# Patient Record
Sex: FEMALE | Race: ASIAN | URBAN - METROPOLITAN AREA
[De-identification: names, ages, dates, MRNs, and addresses within clinical notes are randomized per-mention and may not be internally consistent; named-entity substitution may affect disease eponyms.]

---

## 2017-11-03 ENCOUNTER — OFFICE VISIT (OUTPATIENT)
Dept: PEDIATRICS | Facility: CLINIC | Age: 32
End: 2017-11-03
Payer: COMMERCIAL

## 2017-11-03 VITALS
BODY MASS INDEX: 22.32 KG/M2 | HEIGHT: 63 IN | TEMPERATURE: 98.3 F | SYSTOLIC BLOOD PRESSURE: 100 MMHG | DIASTOLIC BLOOD PRESSURE: 64 MMHG | OXYGEN SATURATION: 98 % | HEART RATE: 88 BPM | WEIGHT: 126 LBS

## 2017-11-03 DIAGNOSIS — Z23 NEED FOR PROPHYLACTIC VACCINATION AND INOCULATION AGAINST INFLUENZA: ICD-10-CM

## 2017-11-03 DIAGNOSIS — Z13.6 SCREENING FOR CARDIOVASCULAR CONDITION: ICD-10-CM

## 2017-11-03 DIAGNOSIS — Z12.4 SCREENING FOR CERVICAL CANCER: ICD-10-CM

## 2017-11-03 DIAGNOSIS — Z86.39 HISTORY OF HYPOTHYROIDISM: ICD-10-CM

## 2017-11-03 DIAGNOSIS — Z13.1 SCREENING FOR DIABETES MELLITUS: ICD-10-CM

## 2017-11-03 DIAGNOSIS — Z23 NEED FOR TDAP VACCINATION: ICD-10-CM

## 2017-11-03 DIAGNOSIS — Z01.419 ENCOUNTER FOR GYNECOLOGICAL EXAMINATION WITHOUT ABNORMAL FINDING: Primary | ICD-10-CM

## 2017-11-03 PROCEDURE — G0145 SCR C/V CYTO,THINLAYER,RESCR: HCPCS | Performed by: PEDIATRICS

## 2017-11-03 PROCEDURE — 90715 TDAP VACCINE 7 YRS/> IM: CPT | Performed by: PEDIATRICS

## 2017-11-03 PROCEDURE — 90472 IMMUNIZATION ADMIN EACH ADD: CPT | Performed by: PEDIATRICS

## 2017-11-03 PROCEDURE — 90471 IMMUNIZATION ADMIN: CPT | Performed by: PEDIATRICS

## 2017-11-03 PROCEDURE — 90686 IIV4 VACC NO PRSV 0.5 ML IM: CPT | Performed by: PEDIATRICS

## 2017-11-03 PROCEDURE — 87624 HPV HI-RISK TYP POOLED RSLT: CPT | Performed by: PEDIATRICS

## 2017-11-03 PROCEDURE — 99385 PREV VISIT NEW AGE 18-39: CPT | Mod: 25 | Performed by: PEDIATRICS

## 2017-11-03 NOTE — PROGRESS NOTES
SUBJECTIVE:   CC: Julianna Nunez is an 32 year old woman who presents for preventive health visit.     Physical   Annual:     Getting at least 3 servings of Calcium per day::  Yes    Bi-annual eye exam::  NO    Dental care twice a year::  NO    Sleep apnea or symptoms of sleep apnea::  None    Diet::  Regular (no restrictions)    Frequency of exercise::  None    Taking medications regularly::  Yes    Medication side effects::  None    Additional concerns today::  YES    Fainted 2 weeks ago after going to the bathroom - has been fine since then.    Is at home taking care of 2.6 y/o boys    Today's PHQ-2 Score:   PHQ-2 ( 1999 [employer]) 11/3/2017   Q1: Little interest or pleasure in doing things 0   Q2: Feeling down, depressed or hopeless 0   PHQ-2 Score 0   Q1: Little interest or pleasure in doing things Not at all   Q2: Feeling down, depressed or hopeless Not at all   PHQ-2 Score 0       Abuse: Current or Past(Physical, Sexual or Emotional)- No  Do you feel safe in your environment - Yes    Social History   Substance Use Topics     Smoking status: Never Smoker     Smokeless tobacco: Never Used     Alcohol use No     The patient does not drink >3 drinks per day nor >7 drinks per week.    Reviewed orders with patient.  Reviewed health maintenance and updated orders accordingly - Yes      Mammogram not appropriate for this patient based on age.    Pertinent mammograms are reviewed under the imaging tab.  History of abnormal Pap smear: NO - age 30-65 PAP every 5 years with negative HPV co-testing recommended    Reviewed and updated as needed this visit by clinical staff  Tobacco  Allergies  Meds  Med Hx  Surg Hx  Fam Hx  Soc Hx        Reviewed and updated as needed this visit by Provider          Review of Systems  C: NEGATIVE for fever, chills, change in weight  I: NEGATIVE for worrisome rashes, moles or lesions  E: NEGATIVE for vision changes or irritation  ENT: NEGATIVE for ear, mouth and throat problems  R:  "NEGATIVE for significant cough or SOB  B: NEGATIVE for masses, tenderness or discharge  CV: NEGATIVE for chest pain, palpitations or peripheral edema  GI: NEGATIVE for nausea, abdominal pain, heartburn, or change in bowel habits  : NEGATIVE for unusual urinary or vaginal symptoms. Periods are regular.  M: NEGATIVE for significant arthralgias or myalgia  N: NEGATIVE for weakness, dizziness or paresthesias  P: NEGATIVE for changes in mood or affect     OBJECTIVE:   /64 (BP Location: Right arm, Cuff Size: Adult Regular)  Pulse 88  Temp 98.3  F (36.8  C) (Oral)  Ht 5' 2.75\" (1.594 m)  Wt 126 lb (57.2 kg)  LMP 10/30/2017  SpO2 98%  BMI 22.5 kg/m2  Physical Exam  GENERAL: healthy, alert and no distress  EYES: Eyes grossly normal to inspection, PERRL and conjunctivae and sclerae normal  HENT: ear canals and TM's normal, nose and mouth without ulcers or lesions  NECK: no adenopathy, no asymmetry, masses, or scars and thyroid normal to palpation  RESP: lungs clear to auscultation - no rales, rhonchi or wheezes  BREAST: normal without masses, tenderness or nipple discharge and no palpable axillary masses or adenopathy  CV: regular rate and rhythm, normal S1 S2, no S3 or S4, no murmur, click or rub, no peripheral edema and peripheral pulses strong  ABDOMEN: soft, nontender, no hepatosplenomegaly, no masses and bowel sounds normal   (female): normal female external genitalia, normal urethral meatus, vaginal mucosa pink, moist, well rugated, and normal cervix/adnexa/uterus without masses or discharge  MS: no gross musculoskeletal defects noted, no edema  SKIN: no suspicious lesions or rashes  NEURO: Normal strength and tone, mentation intact and speech normal  PSYCH: mentation appears normal, affect normal/bright    ASSESSMENT/PLAN:   1. Encounter for gynecological examination without abnormal finding    2. Screening for cervical cancer  - Pap imaged thin layer screen with HPV - recommended age 30 - 65 years " "(select HPV order below)  - HPV High Risk Types DNA Cervical    3. Need for prophylactic vaccination and inoculation against influenza  - FLU VAC, SPLIT VIRUS IM > 3 YO (QUADRIVALENT) [76796]    4. Need for Tdap vaccination  - TDAP VACCINE (ADACEL)  - VACCINE ADMINISTRATION, EACH ADDITIONAL    5. Screening for diabetes mellitus  - Glucose; Future    6. Screening for cardiovascular condition  - Lipid Profile (Chol, Trig, HDL, LDL calc); Future    7. History of hypothyroidism  During pregnancy  - TSH with free T4 reflex; Future    COUNSELING:  Reviewed preventive health counseling, as reflected in patient instructions         reports that she has never smoked. She has never used smokeless tobacco.    Estimated body mass index is 22.5 kg/(m^2) as calculated from the following:    Height as of this encounter: 5' 2.75\" (1.594 m).    Weight as of this encounter: 126 lb (57.2 kg).         Counseling Resources:  ATP IV Guidelines  Pooled Cohorts Equation Calculator  Breast Cancer Risk Calculator  FRAX Risk Assessment  ICSI Preventive Guidelines  Dietary Guidelines for Americans, 2010  USDA's MyPlate  ASA Prophylaxis  Lung CA Screening    Jenny Bland MD  [facility]  "

## 2017-11-03 NOTE — NURSING NOTE
"Chief Complaint   Patient presents with     Physical       Initial /64 (BP Location: Right arm, Cuff Size: Adult Regular)  Pulse 88  Temp 98.3  F (36.8  C) (Oral)  Ht 5' 2.75\" (1.594 m)  Wt 126 lb (57.2 kg)  LMP 10/30/2017  SpO2 98%  BMI 22.5 kg/m2 Estimated body mass index is 22.5 kg/(m^2) as calculated from the following:    Height as of this encounter: 5' 2.75\" (1.594 m).    Weight as of this encounter: 126 lb (57.2 kg).  Medication Reconciliation: complete   Neela Rousseau MA    "

## 2017-11-03 NOTE — PROGRESS NOTES
Injectable Influenza Immunization Documentation    1.  Is the person to be vaccinated sick today?   No    2. Does the person to be vaccinated have an allergy to a component   of the vaccine?   No  Egg Allergy Algorithm Link    3. Has the person to be vaccinated ever had a serious reaction   to influenza vaccine in the past?   No    4. Has the person to be vaccinated ever had Guillain-Barré syndrome?   No    Form completed by Neela Rousseau MA         Screening Questionnaire for Adult Immunization    Are you sick today?   No   Do you have allergies to medications, food, a vaccine component or latex?   No   Have you ever had a serious reaction after receiving a vaccination?   No   Do you have a long-term health problem with heart disease, lung disease, asthma, kidney disease, metabolic disease (e.g. diabetes), anemia, or other blood disorder?   No   Do you have cancer, leukemia, HIV/AIDS, or any other immune system problem?   No   In the past 3 months, have you taken medications that affect  your immune system, such as prednisone, other steroids, or anticancer drugs; drugs for the treatment of rheumatoid arthritis, Crohn s disease, or psoriasis; or have you had radiation treatments?   No   Have you had a seizure, or a brain or other nervous system problem?   No   During the past year, have you received a transfusion of blood or blood     products, or been given immune (gamma) globulin or antiviral drug?   No   For women: Are you pregnant or is there a chance you could become        pregnant during the next month?   No   Have you received any vaccinations in the past 4 weeks?   No     Immunization questionnaire answers were all negative.        Per orders of Dr. Bland, injection of Tdap and Flu given by Neela Rousseau. Patient instructed to remain in clinic for 15 minutes afterwards, and to report any adverse reaction to me immediately.       Screening performed by Neela Rousseau on 11/3/2017 at 2:32 PM.

## 2017-11-03 NOTE — MR AVS SNAPSHOT
After Visit Summary   11/3/2017    Julianna Nunez    MRN: [id number]           Patient Information     Date Of Birth          1985        Visit Information        Provider Department      11/3/2017 1:40 PM Jenny Bland MD [facility]        Today's Diagnoses     Encounter for gynecological examination without abnormal finding    -  1    Screening for cervical cancer          Care Instructions      Preventive Health Recommendations  Female Ages 26 - 39  Yearly exam:   See your health care provider every year in order to    Review health changes.     Discuss preventive care.      Review your medicines if you your doctor has prescribed any.    Until age 30: Get a Pap test every three years (more often if you have had an abnormal result).    After age 30: Talk to your doctor about whether you should have a Pap test every 3 years or have a Pap test with HPV screening every 5 years.   You do not need a Pap test if your uterus was removed (hysterectomy) and you have not had cancer.  You should be tested each year for STDs (sexually transmitted diseases), if you're at risk.   Talk to your provider about how often to have your cholesterol checked.  If you are at risk for diabetes, you should have a diabetes test (fasting glucose).  Shots: Get a flu shot each year. Get a tetanus shot every 10 years.   Nutrition:     Eat at least 5 servings of fruits and vegetables each day.    Eat whole-grain bread, whole-wheat pasta and brown rice instead of white grains and rice.    Talk to your provider about Calcium and Vitamin D.     Lifestyle    Exercise at least 150 minutes a week (30 minutes a day, 5 days of the week). This will help you control your weight and prevent disease.    Limit alcohol to one drink per day.    No smoking.     Wear sunscreen to prevent skin cancer.    See your dentist every six months for an exam and cleaning.            Follow-ups after your visit        Who to contact   "   If you have questions or need follow up information about today's clinic visit or your schedule please contact [facility] directly at [phone].  Normal or non-critical lab and imaging results will be communicated to you by MyChart, letter or phone within 4 business days after the clinic has received the results. If you do not hear from us within 7 days, please contact the clinic through [employer] or phone. If you have a critical or abnormal lab result, we will notify you by phone as soon as possible.  Submit refill requests through [employer] or call your pharmacy and they will forward the refill request to us. Please allow 3 business days for your refill to be completed.          Additional Information About Your Visit        [employer] Information     [employer] lets you send messages to your doctor, view your test results, renew your prescriptions, schedule appointments and more. To sign up, go to [email or web address] . Click on \"Log in\" on the left side of the screen, which will take you to the Welcome page. Then click on \"Sign up Now\" on the right side of the page.     You will be asked to enter the access code listed below, as well as some personal information. Please follow the directions to create your username and password.     Your access code is: [email or web address]  Expires: 2018  2:24 PM     Your access code will  in 90 days. If you need help or a new code, please call your [place] clinic or [phone].        Care EveryWhere ID     This is your Care EveryWhere ID. This could be used by other organizations to access your [place] medical records  [id number]        Your Vitals Were     Pulse Temperature Height Last Period Pulse Oximetry BMI (Body Mass Index)    88 98.3  F (36.8  C) (Oral) 5' 2.75\" (1.594 m) 10/30/2017 98% 22.5 kg/m2       Blood Pressure from Last 3 Encounters:   17 100/64    Weight from Last 3 Encounters:   17 126 lb (57.2 kg)              We Performed the " Following     HPV High Risk Types DNA Cervical     Pap imaged thin layer screen with HPV - recommended age 30 - 65 years (select HPV order below)        Primary Care Provider Office Phone # Fax #    Jenny Bland [phone] [address] [place] [address]        Equal Access to Services     [facility]: Hadii aad ku hadasho Soomaali, waaxda luqadaha, qaybta kaalmada adeegyada, waxulisses hahnin hayshayyn maria del carmen sanabria. So [facility] [phone].    ATENCIÓN: Si habla español, tiene a mccarty disposición servicios gratuitos de asistencia lingüística. [facility] [phone].    We comply with applicable federal civil rights laws and [place] laws. We do not discriminate on the basis of race, color, national origin, age, disability, sex, sexual orientation, or gender identity.            Thank you!     Thank you for choosing [facility]  for your care. Our goal is always to provide you with excellent care. Hearing back from our patients is one way we can continue to improve our services. Please take a few minutes to complete the written survey that you may receive in the mail after your visit with us. Thank you!             Your Updated Medication List - Protect others around you: Learn how to safely use, store and throw away your medicines at www.disposemymeds.org.      Notice  As of 11/3/2017  2:24 PM    You have not been prescribed any medications.

## 2017-11-08 LAB
COPATH REPORT: NORMAL
PAP: NORMAL

## 2017-11-09 LAB
FINAL DIAGNOSIS: NORMAL
HPV HR 12 DNA CVX QL NAA+PROBE: NEGATIVE
HPV16 DNA SPEC QL NAA+PROBE: NEGATIVE
HPV18 DNA SPEC QL NAA+PROBE: NEGATIVE
SPECIMEN DESCRIPTION: NORMAL

## 2017-11-18 DIAGNOSIS — Z13.6 SCREENING FOR CARDIOVASCULAR CONDITION: ICD-10-CM

## 2017-11-18 DIAGNOSIS — Z86.39 HISTORY OF HYPOTHYROIDISM: ICD-10-CM

## 2017-11-18 DIAGNOSIS — Z13.1 SCREENING FOR DIABETES MELLITUS: ICD-10-CM

## 2017-11-18 PROCEDURE — 84439 ASSAY OF FREE THYROXINE: CPT | Performed by: PEDIATRICS

## 2017-11-18 PROCEDURE — 82947 ASSAY GLUCOSE BLOOD QUANT: CPT | Performed by: PEDIATRICS

## 2017-11-18 PROCEDURE — 80061 LIPID PANEL: CPT | Performed by: PEDIATRICS

## 2017-11-18 PROCEDURE — 36415 COLL VENOUS BLD VENIPUNCTURE: CPT | Performed by: PEDIATRICS

## 2017-11-18 PROCEDURE — 84443 ASSAY THYROID STIM HORMONE: CPT | Performed by: PEDIATRICS

## 2017-11-19 LAB
CHOLEST SERPL-MCNC: 128 MG/DL
GLUCOSE SERPL-MCNC: 88 MG/DL (ref 70–99)
HDLC SERPL-MCNC: 46 MG/DL
LDLC SERPL CALC-MCNC: 62 MG/DL
NONHDLC SERPL-MCNC: 82 MG/DL
T4 FREE SERPL-MCNC: 0.8 NG/DL (ref 0.76–1.46)
TRIGL SERPL-MCNC: 101 MG/DL
TSH SERPL DL<=0.005 MIU/L-ACNC: 32.96 MU/L (ref 0.4–4)

## 2017-11-29 ENCOUNTER — OFFICE VISIT (OUTPATIENT)
Dept: PEDIATRICS | Facility: CLINIC | Age: 32
End: 2017-11-29
Payer: COMMERCIAL

## 2017-11-29 VITALS
OXYGEN SATURATION: 99 % | WEIGHT: 126 LBS | BODY MASS INDEX: 22.32 KG/M2 | HEIGHT: 63 IN | HEART RATE: 78 BPM | TEMPERATURE: 97.9 F | DIASTOLIC BLOOD PRESSURE: 64 MMHG | SYSTOLIC BLOOD PRESSURE: 98 MMHG

## 2017-11-29 DIAGNOSIS — E03.8 OTHER SPECIFIED HYPOTHYROIDISM: Primary | ICD-10-CM

## 2017-11-29 DIAGNOSIS — R53.83 OTHER FATIGUE: ICD-10-CM

## 2017-11-29 DIAGNOSIS — E55.9 VITAMIN D DEFICIENCY: ICD-10-CM

## 2017-11-29 PROBLEM — Z86.39 HISTORY OF HYPOTHYROIDISM: Status: RESOLVED | Noted: 2017-11-03 | Resolved: 2017-11-29

## 2017-11-29 LAB
ANION GAP SERPL CALCULATED.3IONS-SCNC: 6 MMOL/L (ref 3–14)
BUN SERPL-MCNC: 9 MG/DL (ref 7–30)
CALCIUM SERPL-MCNC: 9.4 MG/DL (ref 8.5–10.1)
CHLORIDE SERPL-SCNC: 107 MMOL/L (ref 94–109)
CO2 SERPL-SCNC: 26 MMOL/L (ref 20–32)
CREAT SERPL-MCNC: 0.58 MG/DL (ref 0.52–1.04)
DEPRECATED CALCIDIOL+CALCIFEROL SERPL-MC: 7 UG/L (ref 20–75)
GFR SERPL CREATININE-BSD FRML MDRD: >90 ML/MIN/1.7M2
GLUCOSE SERPL-MCNC: 82 MG/DL (ref 70–99)
HGB BLD-MCNC: 12.1 G/DL (ref 11.7–15.7)
POTASSIUM SERPL-SCNC: 3.9 MMOL/L (ref 3.4–5.3)
SODIUM SERPL-SCNC: 139 MMOL/L (ref 133–144)

## 2017-11-29 PROCEDURE — 82306 VITAMIN D 25 HYDROXY: CPT | Performed by: PEDIATRICS

## 2017-11-29 PROCEDURE — 80048 BASIC METABOLIC PNL TOTAL CA: CPT | Performed by: PEDIATRICS

## 2017-11-29 PROCEDURE — 85018 HEMOGLOBIN: CPT | Performed by: PEDIATRICS

## 2017-11-29 PROCEDURE — 99214 OFFICE O/P EST MOD 30 MIN: CPT | Performed by: PEDIATRICS

## 2017-11-29 PROCEDURE — 36415 COLL VENOUS BLD VENIPUNCTURE: CPT | Performed by: PEDIATRICS

## 2017-11-29 RX ORDER — LEVOTHYROXINE SODIUM 75 UG/1
75 TABLET ORAL DAILY
Qty: 90 TABLET | Refills: 1 | Status: SHIPPED | OUTPATIENT
Start: 2017-11-29 | End: 2018-06-29

## 2017-11-29 NOTE — PATIENT INSTRUCTIONS
Start synthroid 75mcg today.    Make lab appt in 6-8 weeks.    Hypothyroidism       You have hypothyroidism. This means your thyroid gland is not making enough thyroid hormone. This hormone is vital to body growth and metabolism. If you don t make enough, many body processes slow down. This can cause symptoms throughout the body. Hypothyroidism can range from mild to severe. The most severe form is called myxedema.  There are a number of causes of hypothyroidism. A common cause is Hashimoto s disease. This disease causes the body s own immune system to attack the thyroid gland. When you have certain treatments, such as surgery to remove the thyroid gland, this can also cause hypothyroidism.  Symptoms of hypothyroidism can include:    Fatigue    Trouble concentrating or thinking clearly; forgetfulness    Dry skin    Hair loss    Weight gain    Low tolerance to cold    Constipation    Depression    Personality changes    Tingling or prickling of the hands or feet    Heavy, absent, or irregular periods (women only)  Older adults may sometimes have other symptoms. These can include:    Muscle aches and weakness    Confusion    Incontinence (unable to control urine or stool)    Trouble moving around    Falling  Treatment for hypothyroidism involves taking thyroid hormone pills daily. These pills replace the hormone your thyroid doesn t make. You will likely need to take a daily pill for the rest of your life. Tips for taking this medicine are given below.  Home care  Tips for taking your medicine    Take your thyroid hormone pills as prescribed by your healthcare provider. This is most often 1 pill a day on an empty stomach. Use a pillbox labeled with the days of the week. This will help you remember to take your pill each day.    Don t take products that contain iron and calcium or antacids within 4 hours of taking your thyroid hormone pills.    Don t take other medicines with your thyroid hormone pill without  checking with your provider first.    Tell your provider if you have any side effects from your medicines that bother you.    Never change the dosage or stop taking your thyroid pills without talking to your provider first.  General care    Always talk with your provider before trying other medicines or treatments for your thyroid problem.    If you see other healthcare providers, be sure to let them know about your thyroid problem.  Follow-up care  See your healthcare provider for checkups as advised. You may need regular tests to check the level of thyroid hormone in your blood.  When to seek medical advice  Call your healthcare provider right away if any of these occur:    New symptoms develop    Symptoms return, continue, or worsen even after treatment    Extreme fatigue    Puffy hands, face, or feet    Fast or irregular heartbeat    Confusion  Call 911  Call 911 right away if any of these occur:    Fainting    Chest pain    Shortness of breath or trouble breathing  Date Last Reviewed: 8/24/2015 2000-2017 [employer]. [address] [place] [address]. All rights reserved. This information is not intended as a substitute for professional medical care. Always follow your healthcare professional's instructions.        Treating Thyroid Problems    Your healthcare provider has diagnosed a thyroid problem and will work with you to create a treatment plan. Even if you don t have symptoms, getting proper care is important. These are the most common types of thyroid disorders and their treatments.  Treating hypothyroidism  Hypothyroidism is an underactive thyroid. There is no cure for this condition. But treatment can relieve most or all of your symptoms caused by the low thyroid hormone levels. Treatment is done with daily thyroid hormone pills. Thyroid hormone pills replace the hormone your thyroid doesn t make. You will likely need to take a daily pill for the rest of your  life.  Your healthcare provider will adjust your dose to achieve the right hormone levels. Take the thyroid hormone pill on an empty stomach, without other medicines. This is to make sure it works as it should.  Over time, your dose may be adjusted. The medicine has minimal side effects if the dosage is correct. But if the dosage is too high, you may have symptoms of an overactive thyroid. These include nervousness, irritability, fast heartbeat, tremors, trouble sleeping, and brittle hair. If the dosage is too low, you may have symptoms of an underactive thyroid. These include dry skin, low energy, sleepiness, and memory problems. Tell your healthcare provider if you notice any symptoms of thyroid problems.  Treating hyperthyroidism  Hyperthyroidism is an overactive thyroid. The treatments include:    Anti-thyroid medicine. This can reduce the amount of thyroid hormone made by your thyroid gland. Reactions from this medicine are rare. But in some cases, it can cause a dangerous drop in white blood cells, and liver damage. Talk with your healthcare provider for more information. If you have a fever or sore throat while taking this medicine, tell your healthcare provider right away.    Radioactive iodine ablation. This is the most common treatment for hyperthyroidism. It s done by taking a pill or liquid dose of radioactive iodine. This treatment destroys the thyroid cells that are making too much hormone. You may need daily thyroid hormone pills after this treatment.    Surgery. This is done by removing part or all of your thyroid gland. After surgery, you may need to take daily thyroid hormone pills.    Beta-blockers. The treatments above may be used alone or with beta-blockers. These are medicines that can reduce symptoms caused by too much thyroid hormone. In some case, symptoms from too much thyroid hormone can be controlled by beta-blockers alone.  Treating nodules  If you have benign nodules, you may not need  treatment right away. Instead, your healthcare provider may advise regular exams and ultrasound tests to see if the nodules grow. If treatment is needed, it may include:    Anti-thyroid medicine. If a benign thyroid nodule is causing an overactive thyroid, your healthcare provider may first treat it with anti-thyroid medicine. If this doesn t work, you may need one of the below treatments.    Radioactive iodine ablation. This is the most common treatment for hyperthyroidism. It s done by taking a pill or liquid dose of radioactive iodine. This treatment destroys the thyroid cells that are making too much hormone. You may need daily thyroid hormone pills after this treatment.    Surgery. This may be done to treat nodules that are causing symptoms, such as choking, or trouble swallowing. Surgery is done by removing part or all of your thyroid gland. Surgery to remove cancerous nodules may be followed by radioiodine iodine ablation. After surgery, you may need to take daily thyroid hormone pills.  If you have radioactive iodine ablation  Even though it uses tiny amounts of radiation, radioactive iodine ablation is a safe treatment. Your healthcare provider will talk with you about any risks and possible complications. You will likely receive the iodine at the hospital and go home the same day. The risk from the radiation to yourself and others is very small. However, you may need to stay away from other people for several days. It is most important to avoid children and pregnant women during this time.   Date Last Reviewed: 11/1/2016 2000-2017 [employer]. [address] [place] [address], [place] [address]. All rights reserved. This information is not intended as a substitute for professional medical care. Always follow your healthcare professional's instructions.

## 2017-11-29 NOTE — PROGRESS NOTES
"  SUBJECTIVE:   Julianna Levi is a 32 year old female who presents to clinic today for the following health issues:      Thyroid test results from 11-    Amount of exercise or physical activity: None    Problems taking medications regularly: No    Medication side effects: not applicable    Diet: regular (no restrictions)    Patient also concerned about other causes for fatigue - she gets tired after walking for only 30 min.  No other symptoms of hypothyroid (regular periods, no hair loss, no weight gain). She would like her calcium, vitamin D and hemoglobin checked.  No taking any supplements currently.    She was on synthroid during pregnancy and was actually told to continue this post pregnancy, but stopped.       Problem list and histories reviewed & adjusted, as indicated.  Additional history: as documented    Reviewed and updated as needed this visit by clinical staff     Reviewed and updated as needed this visit by Provider         ROS:  Constitutional, HEENT, cardiovascular, pulmonary, gi and gu systems are negative, except as otherwise noted.      OBJECTIVE:   BP 98/64 (BP Location: Right arm, Cuff Size: Adult Regular)  Pulse 78  Temp 97.9  F (36.6  C) (Oral)  Ht 5' 2.75\" (1.594 m)  Wt 126 lb (57.2 kg)  LMP 11/27/2017  SpO2 99%  BMI 22.5 kg/m2  Body mass index is 22.5 kg/(m^2).  NA    Diagnostic Test Results:  none     ASSESSMENT/PLAN:       1. Other specified hypothyroidism  Start 75mcg - lab follow up in 6-8 weeks and adjust as needed.  - levothyroxine (SYNTHROID/LEVOTHROID) 75 MCG tablet; Take 1 tablet (75 mcg) by mouth daily  Dispense: 90 tablet; Refill: 1  - TSH with free T4 reflex; Future    2. Other fatigue  - Vitamin D Deficiency  - Basic metabolic panel  - Hemoglobin    Patient Instructions       Start synthroid 75mcg today.    Make lab appt in 6-8 weeks.    Hypothyroidism       You have hypothyroidism. This means your thyroid gland is not making enough thyroid hormone. This hormone " is vital to body growth and metabolism. If you don t make enough, many body processes slow down. This can cause symptoms throughout the body. Hypothyroidism can range from mild to severe. The most severe form is called myxedema.  There are a number of causes of hypothyroidism. A common cause is Hashimoto s disease. This disease causes the body s own immune system to attack the thyroid gland. When you have certain treatments, such as surgery to remove the thyroid gland, this can also cause hypothyroidism.  Symptoms of hypothyroidism can include:    Fatigue    Trouble concentrating or thinking clearly; forgetfulness    Dry skin    Hair loss    Weight gain    Low tolerance to cold    Constipation    Depression    Personality changes    Tingling or prickling of the hands or feet    Heavy, absent, or irregular periods (women only)  Older adults may sometimes have other symptoms. These can include:    Muscle aches and weakness    Confusion    Incontinence (unable to control urine or stool)    Trouble moving around    Falling  Treatment for hypothyroidism involves taking thyroid hormone pills daily. These pills replace the hormone your thyroid doesn t make. You will likely need to take a daily pill for the rest of your life. Tips for taking this medicine are given below.  Home care  Tips for taking your medicine    Take your thyroid hormone pills as prescribed by your healthcare provider. This is most often 1 pill a day on an empty stomach. Use a pillbox labeled with the days of the week. This will help you remember to take your pill each day.    Don t take products that contain iron and calcium or antacids within 4 hours of taking your thyroid hormone pills.    Don t take other medicines with your thyroid hormone pill without checking with your provider first.    Tell your provider if you have any side effects from your medicines that bother you.    Never change the dosage or stop taking your thyroid pills without talking  to your provider first.  General care    Always talk with your provider before trying other medicines or treatments for your thyroid problem.    If you see other healthcare providers, be sure to let them know about your thyroid problem.  Follow-up care  See your healthcare provider for checkups as advised. You may need regular tests to check the level of thyroid hormone in your blood.  When to seek medical advice  Call your healthcare provider right away if any of these occur:    New symptoms develop    Symptoms return, continue, or worsen even after treatment    Extreme fatigue    Puffy hands, face, or feet    Fast or irregular heartbeat    Confusion  Call 911  Call 911 right away if any of these occur:    Fainting    Chest pain    Shortness of breath or trouble breathing  Date Last Reviewed: 8/24/2015 2000-2017 [employer]. [address] [place] [address], [place] [address]. All rights reserved. This information is not intended as a substitute for professional medical care. Always follow your healthcare professional's instructions.        Treating Thyroid Problems    Your healthcare provider has diagnosed a thyroid problem and will work with you to create a treatment plan. Even if you don t have symptoms, getting proper care is important. These are the most common types of thyroid disorders and their treatments.  Treating hypothyroidism  Hypothyroidism is an underactive thyroid. There is no cure for this condition. But treatment can relieve most or all of your symptoms caused by the low thyroid hormone levels. Treatment is done with daily thyroid hormone pills. Thyroid hormone pills replace the hormone your thyroid doesn t make. You will likely need to take a daily pill for the rest of your life.  Your healthcare provider will adjust your dose to achieve the right hormone levels. Take the thyroid hormone pill on an empty stomach, without other medicines. This is to make sure it works as it should.  Over  time, your dose may be adjusted. The medicine has minimal side effects if the dosage is correct. But if the dosage is too high, you may have symptoms of an overactive thyroid. These include nervousness, irritability, fast heartbeat, tremors, trouble sleeping, and brittle hair. If the dosage is too low, you may have symptoms of an underactive thyroid. These include dry skin, low energy, sleepiness, and memory problems. Tell your healthcare provider if you notice any symptoms of thyroid problems.  Treating hyperthyroidism  Hyperthyroidism is an overactive thyroid. The treatments include:    Anti-thyroid medicine. This can reduce the amount of thyroid hormone made by your thyroid gland. Reactions from this medicine are rare. But in some cases, it can cause a dangerous drop in white blood cells, and liver damage. Talk with your healthcare provider for more information. If you have a fever or sore throat while taking this medicine, tell your healthcare provider right away.    Radioactive iodine ablation. This is the most common treatment for hyperthyroidism. It s done by taking a pill or liquid dose of radioactive iodine. This treatment destroys the thyroid cells that are making too much hormone. You may need daily thyroid hormone pills after this treatment.    Surgery. This is done by removing part or all of your thyroid gland. After surgery, you may need to take daily thyroid hormone pills.    Beta-blockers. The treatments above may be used alone or with beta-blockers. These are medicines that can reduce symptoms caused by too much thyroid hormone. In some case, symptoms from too much thyroid hormone can be controlled by beta-blockers alone.  Treating nodules  If you have benign nodules, you may not need treatment right away. Instead, your healthcare provider may advise regular exams and ultrasound tests to see if the nodules grow. If treatment is needed, it may include:    Anti-thyroid medicine. If a benign thyroid  nodule is causing an overactive thyroid, your healthcare provider may first treat it with anti-thyroid medicine. If this doesn t work, you may need one of the below treatments.    Radioactive iodine ablation. This is the most common treatment for hyperthyroidism. It s done by taking a pill or liquid dose of radioactive iodine. This treatment destroys the thyroid cells that are making too much hormone. You may need daily thyroid hormone pills after this treatment.    Surgery. This may be done to treat nodules that are causing symptoms, such as choking, or trouble swallowing. Surgery is done by removing part or all of your thyroid gland. Surgery to remove cancerous nodules may be followed by radioiodine iodine ablation. After surgery, you may need to take daily thyroid hormone pills.  If you have radioactive iodine ablation  Even though it uses tiny amounts of radiation, radioactive iodine ablation is a safe treatment. Your healthcare provider will talk with you about any risks and possible complications. You will likely receive the iodine at the hospital and go home the same day. The risk from the radiation to yourself and others is very small. However, you may need to stay away from other people for several days. It is most important to avoid children and pregnant women during this time.   Date Last Reviewed: 11/1/2016 2000-2017 [employer]. [address] [place] [address], [place] [address]. All rights reserved. This information is not intended as a substitute for professional medical care. Always follow your healthcare professional's instructions.            Jenny Bland MD  [facility]

## 2017-11-29 NOTE — NURSING NOTE
"Chief Complaint   Patient presents with     Thyroid Problem       Initial BP 98/64 (BP Location: Right arm, Cuff Size: Adult Regular)  Pulse 78  Temp 97.9  F (36.6  C) (Oral)  Ht 5' 2.75\" (1.594 m)  Wt 126 lb (57.2 kg)  LMP 11/27/2017  SpO2 99%  BMI 22.5 kg/m2 Estimated body mass index is 22.5 kg/(m^2) as calculated from the following:    Height as of this encounter: 5' 2.75\" (1.594 m).    Weight as of this encounter: 126 lb (57.2 kg).  Medication Reconciliation: complete   Neela Rousseau MA    "

## 2017-11-29 NOTE — MR AVS SNAPSHOT
After Visit Summary   11/29/2017    Julianna Levi    MRN: [id number]           Patient Information     Date Of Birth          1985        Visit Information        Provider Department      11/29/2017 9:20 AM Jenny Bland MD [facility]        Today's Diagnoses     Other specified hypothyroidism    -  1      Care Instructions      Start synthroid 75mcg today.    Make lab appt in 6-8 weeks.    Hypothyroidism       You have hypothyroidism. This means your thyroid gland is not making enough thyroid hormone. This hormone is vital to body growth and metabolism. If you don t make enough, many body processes slow down. This can cause symptoms throughout the body. Hypothyroidism can range from mild to severe. The most severe form is called myxedema.  There are a number of causes of hypothyroidism. A common cause is Hashimoto s disease. This disease causes the body s own immune system to attack the thyroid gland. When you have certain treatments, such as surgery to remove the thyroid gland, this can also cause hypothyroidism.  Symptoms of hypothyroidism can include:    Fatigue    Trouble concentrating or thinking clearly; forgetfulness    Dry skin    Hair loss    Weight gain    Low tolerance to cold    Constipation    Depression    Personality changes    Tingling or prickling of the hands or feet    Heavy, absent, or irregular periods (women only)  Older adults may sometimes have other symptoms. These can include:    Muscle aches and weakness    Confusion    Incontinence (unable to control urine or stool)    Trouble moving around    Falling  Treatment for hypothyroidism involves taking thyroid hormone pills daily. These pills replace the hormone your thyroid doesn t make. You will likely need to take a daily pill for the rest of your life. Tips for taking this medicine are given below.  Home care  Tips for taking your medicine    Take your thyroid hormone pills as prescribed by your  healthcare provider. This is most often 1 pill a day on an empty stomach. Use a pillbox labeled with the days of the week. This will help you remember to take your pill each day.    Don t take products that contain iron and calcium or antacids within 4 hours of taking your thyroid hormone pills.    Don t take other medicines with your thyroid hormone pill without checking with your provider first.    Tell your provider if you have any side effects from your medicines that bother you.    Never change the dosage or stop taking your thyroid pills without talking to your provider first.  General care    Always talk with your provider before trying other medicines or treatments for your thyroid problem.    If you see other healthcare providers, be sure to let them know about your thyroid problem.  Follow-up care  See your healthcare provider for checkups as advised. You may need regular tests to check the level of thyroid hormone in your blood.  When to seek medical advice  Call your healthcare provider right away if any of these occur:    New symptoms develop    Symptoms return, continue, or worsen even after treatment    Extreme fatigue    Puffy hands, face, or feet    Fast or irregular heartbeat    Confusion  Call 911  Call 911 right away if any of these occur:    Fainting    Chest pain    Shortness of breath or trouble breathing  Date Last Reviewed: 8/24/2015 2000-2017 [employer]. [address] [place] [address], [place] [address]. All rights reserved. This information is not intended as a substitute for professional medical care. Always follow your healthcare professional's instructions.        Treating Thyroid Problems    Your healthcare provider has diagnosed a thyroid problem and will work with you to create a treatment plan. Even if you don t have symptoms, getting proper care is important. These are the most common types of thyroid disorders and their treatments.  Treating  hypothyroidism  Hypothyroidism is an underactive thyroid. There is no cure for this condition. But treatment can relieve most or all of your symptoms caused by the low thyroid hormone levels. Treatment is done with daily thyroid hormone pills. Thyroid hormone pills replace the hormone your thyroid doesn t make. You will likely need to take a daily pill for the rest of your life.  Your healthcare provider will adjust your dose to achieve the right hormone levels. Take the thyroid hormone pill on an empty stomach, without other medicines. This is to make sure it works as it should.  Over time, your dose may be adjusted. The medicine has minimal side effects if the dosage is correct. But if the dosage is too high, you may have symptoms of an overactive thyroid. These include nervousness, irritability, fast heartbeat, tremors, trouble sleeping, and brittle hair. If the dosage is too low, you may have symptoms of an underactive thyroid. These include dry skin, low energy, sleepiness, and memory problems. Tell your healthcare provider if you notice any symptoms of thyroid problems.  Treating hyperthyroidism  Hyperthyroidism is an overactive thyroid. The treatments include:    Anti-thyroid medicine. This can reduce the amount of thyroid hormone made by your thyroid gland. Reactions from this medicine are rare. But in some cases, it can cause a dangerous drop in white blood cells, and liver damage. Talk with your healthcare provider for more information. If you have a fever or sore throat while taking this medicine, tell your healthcare provider right away.    Radioactive iodine ablation. This is the most common treatment for hyperthyroidism. It s done by taking a pill or liquid dose of radioactive iodine. This treatment destroys the thyroid cells that are making too much hormone. You may need daily thyroid hormone pills after this treatment.    Surgery. This is done by removing part or all of your thyroid gland. After  surgery, you may need to take daily thyroid hormone pills.    Beta-blockers. The treatments above may be used alone or with beta-blockers. These are medicines that can reduce symptoms caused by too much thyroid hormone. In some case, symptoms from too much thyroid hormone can be controlled by beta-blockers alone.  Treating nodules  If you have benign nodules, you may not need treatment right away. Instead, your healthcare provider may advise regular exams and ultrasound tests to see if the nodules grow. If treatment is needed, it may include:    Anti-thyroid medicine. If a benign thyroid nodule is causing an overactive thyroid, your healthcare provider may first treat it with anti-thyroid medicine. If this doesn t work, you may need one of the below treatments.    Radioactive iodine ablation. This is the most common treatment for hyperthyroidism. It s done by taking a pill or liquid dose of radioactive iodine. This treatment destroys the thyroid cells that are making too much hormone. You may need daily thyroid hormone pills after this treatment.    Surgery. This may be done to treat nodules that are causing symptoms, such as choking, or trouble swallowing. Surgery is done by removing part or all of your thyroid gland. Surgery to remove cancerous nodules may be followed by radioiodine iodine ablation. After surgery, you may need to take daily thyroid hormone pills.  If you have radioactive iodine ablation  Even though it uses tiny amounts of radiation, radioactive iodine ablation is a safe treatment. Your healthcare provider will talk with you about any risks and possible complications. You will likely receive the iodine at the hospital and go home the same day. The risk from the radiation to yourself and others is very small. However, you may need to stay away from other people for several days. It is most important to avoid children and pregnant women during this time.   Date Last Reviewed: 11/1/2016 2000-2017  "[employer]. [address] [place] [address]. All rights reserved. This information is not intended as a substitute for professional medical care. Always follow your healthcare professional's instructions.                Follow-ups after your visit        Who to contact     If you have questions or need follow up information about today's clinic visit or your schedule please contact [facility] directly at [phone].  Normal or non-critical lab and imaging results will be communicated to you by [employer], letter or phone within 4 business days after the clinic has received the results. If you do not hear from us within 7 days, please contact the clinic through [employer] or phone. If you have a critical or abnormal lab result, we will notify you by phone as soon as possible.  Submit refill requests through [employer] or call your pharmacy and they will forward the refill request to us. Please allow 3 business days for your refill to be completed.          Additional Information About Your Visit        [employer] Information     [employer] gives you secure access to your electronic health record. If you see a primary care provider, you can also send messages to your care team and make appointments. If you have questions, please call your primary care clinic.  If you do not have a primary care provider, please call [phone] and they will assist you.        Care EveryWhere ID     This is your Care EveryWhere ID. This could be used by other organizations to access your [facility] medical records  [id number]        Your Vitals Were     Pulse Temperature Height Last Period Pulse Oximetry BMI (Body Mass Index)    78 97.9  F (36.6  C) (Oral) 5' 2.75\" (1.594 m) 11/27/2017 99% 22.5 kg/m2       Blood Pressure from Last 3 Encounters:   11/29/17 98/64   11/03/17 100/64    Weight from Last 3 Encounters:   11/29/17 126 lb (57.2 kg)   11/03/17 126 lb (57.2 kg)              Today, you had the following  "    No orders found for display       Primary Care Provider Office Phone # Fax #    Jenny Bland [phone] [address] [place] [address]        Equal Access to Services     [facility] LEONID : Hadii lanie ku hadjarrodo Soomaali, waaxda luqadaha, qaybta kaalmada adeegyada, kaushik farfann jamiebhavana escobar laAravindmarkell sanabria. So [facility] [phone].    ATENCIÓN: Si habla español, tiene a mccarty disposición servicios gratuitos de asistencia lingüística. Llame al [phone].    We comply with applicable federal civil rights laws and [place] laws. We do not discriminate on the basis of race, color, national origin, age, disability, sex, sexual orientation, or gender identity.            Thank you!     Thank you for choosing [facility]  for your care. Our goal is always to provide you with excellent care. Hearing back from our patients is one way we can continue to improve our services. Please take a few minutes to complete the written survey that you may receive in the mail after your visit with us. Thank you!             Your Updated Medication List - Protect others around you: Learn how to safely use, store and throw away your medicines at www.disposemymeds.org.      Notice  As of 11/29/2017  9:45 AM    You have not been prescribed any medications.

## 2017-11-30 PROBLEM — E55.9 VITAMIN D DEFICIENCY: Status: ACTIVE | Noted: 2017-11-30

## 2018-06-28 ENCOUNTER — MYC MEDICAL ADVICE (OUTPATIENT)
Dept: PEDIATRICS | Facility: CLINIC | Age: 33
End: 2018-06-28

## 2018-06-28 DIAGNOSIS — E03.8 OTHER SPECIFIED HYPOTHYROIDISM: ICD-10-CM

## 2018-06-29 DIAGNOSIS — E03.8 OTHER SPECIFIED HYPOTHYROIDISM: ICD-10-CM

## 2018-06-29 RX ORDER — LEVOTHYROXINE SODIUM 75 UG/1
75 TABLET ORAL DAILY
Qty: 90 TABLET | Refills: 1 | Status: CANCELLED | OUTPATIENT
Start: 2018-06-29

## 2018-06-29 RX ORDER — LEVOTHYROXINE SODIUM 75 UG/1
75 TABLET ORAL DAILY
Qty: 30 TABLET | Refills: 1 | Status: SHIPPED | OUTPATIENT
Start: 2018-06-29

## 2018-06-29 NOTE — TELEPHONE ENCOUNTER
Patient advised to do lab work and let us know if she needs a refill prior to us getting lab results.    Refill on levothyroxine:    TSH   Date Value Ref Range Status   11/18/2017 32.96 (H) 0.40 - 4.00 mU/L Final     Per plan: Start 75g - lab follow up in 6-8 weeks and adjust as needed.  Patient is due for recheck.  Advised to schedule a lab appointment-order has already been placed.    Eri Crews RN  Message handled by Nurse Triage.

## 2018-06-29 NOTE — TELEPHONE ENCOUNTER
Dr. Bland-[place] medication for 14 days now-when should patient do labs/when should she start medication again ( she needs it filled?  See the message below-over-due for a recheck.    Eri Crews RN  Message handled by Nurse Triage.

## 2018-06-29 NOTE — TELEPHONE ENCOUNTER
"Requested Prescriptions   Pending Prescriptions Disp Refills     levothyroxine (SYNTHROID/LEVOTHROID) 75 MCG tablet    Last Written Prescription Date:  11/29/2017  Last Fill Quantity: 90,  # refills: 1   Last office visit: 11/29/2017 with prescribing provider:  Jenny Bland     Future Office Visit:     90 tablet 1     Sig: Take 1 tablet (75 mcg) by mouth daily    Thyroid Protocol Failed    6/29/2018  9:45 AM       Failed - Normal TSH on file in past 12 months    Recent Labs   Lab Test  11/18/17   0903   TSH  32.96*             Passed - Patient is 12 years or older       Passed - Recent (12 mo) or future (30 days) visit within the authorizing provider's specialty    Patient had office visit in the last 12 months or has a visit in the next 30 days with authorizing provider or within the authorizing provider's specialty.  See \"Patient Info\" tab in inbasket, or \"Choose Columns\" in Meds & Orders section of the refill encounter.           Passed - No active pregnancy on record    If patient is pregnant or has had a positive pregnancy test, please check TSH.         Passed - No positive pregnancy test in past 12 months    If patient is pregnant or has had a positive pregnancy test, please check TSH.            "

## 2018-06-29 NOTE — TELEPHONE ENCOUNTER
Have her restart previous dose (new prescription sent).     Then lab only visit in 6-8 weeks (ordered).    Jenny Bland MD  Internal Medicine/Pediatrics  [facility]

## 2018-06-30 ENCOUNTER — MYC MEDICAL ADVICE (OUTPATIENT)
Dept: PEDIATRICS | Facility: CLINIC | Age: 33
End: 2018-06-30

## 2018-06-30 DIAGNOSIS — E03.8 OTHER SPECIFIED HYPOTHYROIDISM: ICD-10-CM

## 2018-08-27 DIAGNOSIS — E03.8 OTHER SPECIFIED HYPOTHYROIDISM: ICD-10-CM

## 2018-08-27 PROCEDURE — 84439 ASSAY OF FREE THYROXINE: CPT | Performed by: PEDIATRICS

## 2018-08-27 PROCEDURE — 36415 COLL VENOUS BLD VENIPUNCTURE: CPT | Performed by: PEDIATRICS

## 2018-08-27 PROCEDURE — 84443 ASSAY THYROID STIM HORMONE: CPT | Performed by: PEDIATRICS

## 2018-08-28 LAB
T4 FREE SERPL-MCNC: 1.37 NG/DL (ref 0.76–1.46)
TSH SERPL DL<=0.005 MIU/L-ACNC: 0.18 MU/L (ref 0.4–4)

## 2018-08-29 DIAGNOSIS — E03.8 OTHER SPECIFIED HYPOTHYROIDISM: Primary | ICD-10-CM

## 2018-08-29 RX ORDER — LEVOTHYROXINE SODIUM 50 UG/1
50 TABLET ORAL DAILY
Qty: 90 TABLET | Refills: 1 | Status: SHIPPED | OUTPATIENT
Start: 2018-08-29 | End: 2018-11-01

## 2018-10-31 ENCOUNTER — OFFICE VISIT (OUTPATIENT)
Dept: PEDIATRICS | Facility: CLINIC | Age: 33
End: 2018-10-31
Payer: COMMERCIAL

## 2018-10-31 VITALS
HEART RATE: 74 BPM | HEIGHT: 63 IN | WEIGHT: 124 LBS | BODY MASS INDEX: 21.97 KG/M2 | TEMPERATURE: 98 F | SYSTOLIC BLOOD PRESSURE: 102 MMHG | OXYGEN SATURATION: 99 % | DIASTOLIC BLOOD PRESSURE: 62 MMHG

## 2018-10-31 DIAGNOSIS — Z13.1 SCREENING FOR DIABETES MELLITUS: ICD-10-CM

## 2018-10-31 DIAGNOSIS — Z00.00 ROUTINE GENERAL MEDICAL EXAMINATION AT A HEALTH CARE FACILITY: Primary | ICD-10-CM

## 2018-10-31 DIAGNOSIS — Z23 NEED FOR PROPHYLACTIC VACCINATION AND INOCULATION AGAINST INFLUENZA: ICD-10-CM

## 2018-10-31 DIAGNOSIS — Z13.6 SCREENING FOR CARDIOVASCULAR CONDITION: ICD-10-CM

## 2018-10-31 DIAGNOSIS — E55.9 VITAMIN D DEFICIENCY: ICD-10-CM

## 2018-10-31 DIAGNOSIS — E03.8 OTHER SPECIFIED HYPOTHYROIDISM: ICD-10-CM

## 2018-10-31 LAB
CHOLEST SERPL-MCNC: 116 MG/DL
DEPRECATED CALCIDIOL+CALCIFEROL SERPL-MC: 20 UG/L (ref 20–75)
GLUCOSE SERPL-MCNC: 83 MG/DL (ref 70–99)
HDLC SERPL-MCNC: 47 MG/DL
LDLC SERPL CALC-MCNC: 56 MG/DL
NONHDLC SERPL-MCNC: 69 MG/DL
T4 FREE SERPL-MCNC: 1.02 NG/DL (ref 0.76–1.46)
TRIGL SERPL-MCNC: 67 MG/DL
TSH SERPL DL<=0.005 MIU/L-ACNC: 9.32 MU/L (ref 0.4–4)

## 2018-10-31 PROCEDURE — 82306 VITAMIN D 25 HYDROXY: CPT | Performed by: PEDIATRICS

## 2018-10-31 PROCEDURE — 99395 PREV VISIT EST AGE 18-39: CPT | Mod: 25 | Performed by: PEDIATRICS

## 2018-10-31 PROCEDURE — 90471 IMMUNIZATION ADMIN: CPT | Performed by: PEDIATRICS

## 2018-10-31 PROCEDURE — 82947 ASSAY GLUCOSE BLOOD QUANT: CPT | Performed by: PEDIATRICS

## 2018-10-31 PROCEDURE — 84439 ASSAY OF FREE THYROXINE: CPT | Performed by: PEDIATRICS

## 2018-10-31 PROCEDURE — 84443 ASSAY THYROID STIM HORMONE: CPT | Performed by: PEDIATRICS

## 2018-10-31 PROCEDURE — 80061 LIPID PANEL: CPT | Performed by: PEDIATRICS

## 2018-10-31 PROCEDURE — 90686 IIV4 VACC NO PRSV 0.5 ML IM: CPT | Performed by: PEDIATRICS

## 2018-10-31 PROCEDURE — 36415 COLL VENOUS BLD VENIPUNCTURE: CPT | Performed by: PEDIATRICS

## 2018-10-31 RX ORDER — LEVOTHYROXINE SODIUM 50 UG/1
50 TABLET ORAL DAILY
Qty: 90 TABLET | Refills: 1 | Status: CANCELLED | OUTPATIENT
Start: 2018-10-31

## 2018-10-31 RX ORDER — CHOLECALCIFEROL (VITAMIN D3) 125 MCG
1 TABLET ORAL DAILY
COMMUNITY

## 2018-10-31 ASSESSMENT — ENCOUNTER SYMPTOMS
DIARRHEA: 0
DIZZINESS: 0
CONSTIPATION: 0
HEMATOCHEZIA: 0
EYE PAIN: 0
CHILLS: 0
ABDOMINAL PAIN: 0
COUGH: 0
HEMATURIA: 0

## 2018-10-31 NOTE — MR AVS SNAPSHOT
After Visit Summary   10/31/2018    Julianna Levi    MRN: [id number]           Patient Information     Date Of Birth          1985        Visit Information        Provider Department      10/31/2018 7:40 AM Jenny Bland MD [facility]        Today's Diagnoses     Need for prophylactic vaccination and inoculation against influenza    -  1    Other specified hypothyroidism        Screening for cardiovascular condition        Vitamin D deficiency        Screening for diabetes mellitus          Care Instructions      Preventive Health Recommendations  Female Ages 26 - 39  Yearly exam:   See your health care provider every year in order to    Review health changes.     Discuss preventive care.      Review your medicines if you your doctor has prescribed any.    Until age 30: Get a Pap test every three years (more often if you have had an abnormal result).    After age 30: Talk to your doctor about whether you should have a Pap test every 3 years or have a Pap test with HPV screening every 5 years.   You do not need a Pap test if your uterus was removed (hysterectomy) and you have not had cancer.  You should be tested each year for STDs (sexually transmitted diseases), if you're at risk.   Talk to your provider about how often to have your cholesterol checked.  If you are at risk for diabetes, you should have a diabetes test (fasting glucose).  Shots: Get a flu shot each year. Get a tetanus shot every 10 years.   Nutrition:     Eat at least 5 servings of fruits and vegetables each day.    Eat whole-grain bread, whole-wheat pasta and brown rice instead of white grains and rice.    Get adequate Calcium and Vitamin D.     Lifestyle    Exercise at least 150 minutes a week (30 minutes a day, 5 days of the week). This will help you control your weight and prevent disease.    Limit alcohol to one drink per day.    No smoking.     Wear sunscreen to prevent skin cancer.    See your dentist  "every six months for an exam and cleaning.            Follow-ups after your visit        Follow-up notes from your care team     Return in about 1 year (around 10/31/2019) for Physical Exam.      Who to contact     If you have questions or need follow up information about today's clinic visit or your schedule please contact [facility] directly at [phone].  Normal or non-critical lab and imaging results will be communicated to you by MyChart, letter or phone within 4 business days after the clinic has received the results. If you do not hear from us within 7 days, please contact the clinic through [employer] or phone. If you have a critical or abnormal lab result, we will notify you by phone as soon as possible.  Submit refill requests through [employer] or call your pharmacy and they will forward the refill request to us. Please allow 3 business days for your refill to be completed.          Additional Information About Your Visit        [employer] Information     [employer] gives you secure access to your electronic health record. If you see a primary care provider, you can also send messages to your care team and make appointments. If you have questions, please call your primary care clinic.  If you do not have a primary care provider, please call [phone] and they will assist you.        Care EveryWhere ID     This is your Care EveryWhere ID. This could be used by other organizations to access your [facility] medical records  [id number]        Your Vitals Were     Pulse Temperature Height Last Period Pulse Oximetry BMI (Body Mass Index)    74 98  F (36.7  C) (Oral) 5' 2.75\" (1.594 m) 10/25/2018 99% 22.14 kg/m2       Blood Pressure from Last 3 Encounters:   10/31/18 102/62   11/29/17 98/64   11/03/17 100/64    Weight from Last 3 Encounters:   10/31/18 124 lb (56.2 kg)   11/29/17 126 lb (57.2 kg)   11/03/17 126 lb (57.2 kg)              We Performed the Following     FLU VACCINE, SPLIT VIRUS, IM " (QUADRIVALENT) [14919]- >3 YRS     Glucose     Lipid Profile (Chol, Trig, HDL, LDL calc)     TSH with free T4 reflex     Vitamin D Deficiency        Primary Care Provider Office Phone # Fax #    Jenny Bland [phone] [address] [place] [address]        Equal Access to Services     [facility]: Hadii aad ku hadasho Soomaali, waaxda luqadaha, qaybta kaalmada adeegyada, waxay idiin hayaan adeeg kharash laAravindaan . So [facility] [phone].    ATENCIÓN: Si habla español, tiene a mccarty disposición servicios gratuitos de asistencia lingüística. [facility] [phone].    We comply with applicable federal civil rights laws and [place] laws. We do not discriminate on the basis of race, color, national origin, age, disability, sex, sexual orientation, or gender identity.            Thank you!     Thank you for choosing [facility]  for your care. Our goal is always to provide you with excellent care. Hearing back from our patients is one way we can continue to improve our services. Please take a few minutes to complete the written survey that you may receive in the mail after your visit with us. Thank you!             Your Updated Medication List - Protect others around you: Learn how to safely use, store and throw away your medicines at www.disposemymeds.org.          This list is accurate as of 10/31/18  8:05 AM.  Always use your most recent med list.                   Brand Name Dispense Instructions for use Diagnosis    * levothyroxine 75 MCG tablet    SYNTHROID/LEVOTHROID    30 tablet    Take 1 tablet (75 mcg) by mouth daily    Other specified hypothyroidism       * levothyroxine 50 MCG tablet    SYNTHROID/LEVOTHROID    90 tablet    Take 1 tablet (50 mcg) by mouth daily    Other specified hypothyroidism       Vitamin D2 2000 units Tabs      Take 1 tablet by mouth daily        * Notice:  This list has 2 medication(s) that are the same as other medications prescribed  for you. Read the directions carefully, and ask your doctor or other care provider to review them with you.

## 2018-10-31 NOTE — PROGRESS NOTES
SUBJECTIVE:   CC: Julianna Levi is an 33 year old woman who presents for preventive health visit.     Physical   Annual:     Getting at least 3 servings of Calcium per day:  Yes    Bi-annual eye exam:  Yes    Dental care twice a year:  NO    Sleep apnea or symptoms of sleep apnea:  None    Diet:  Regular (no restrictions)    Frequency of exercise:  4-5 days/week    Duration of exercise:  30-45 minutes    Taking medications regularly:  No    Barriers to taking medications:  None    Medication side effects:  None    Additional concerns today:  No    Treadmill and cycling for exercise.     Patient feeling good regarding thyroid - feels more energy than last year, no other complaints.     Today's PHQ-2 Score:   PHQ-2 ( 1999 Pfizer) 10/31/2018   Q1: Little interest or pleasure in doing things 1   Q2: Feeling down, depressed or hopeless 0   PHQ-2 Score 1   Q1: Little interest or pleasure in doing things Several days   Q2: Feeling down, depressed or hopeless Not at all   PHQ-2 Score 1       Abuse: Current or Past(Physical, Sexual or Emotional)- No  Do you feel safe in your environment - Yes    Social History   Substance Use Topics     Smoking status: Never Smoker     Smokeless tobacco: Never Used     Alcohol use No     Alcohol Use 10/31/2018   If you drink alcohol do you typically have greater than 3 drinks per day OR greater than 7 drinks per week? No       Reviewed orders with patient.  Reviewed health maintenance and updated orders accordingly - Yes  BP Readings from Last 3 Encounters:   10/31/18 102/62   11/29/17 98/64   11/03/17 100/64    Wt Readings from Last 3 Encounters:   10/31/18 124 lb (56.2 kg)   11/29/17 126 lb (57.2 kg)   11/03/17 126 lb (57.2 kg)                    Mammogram not appropriate for this patient based on age.    Pertinent mammograms are reviewed under the imaging tab.  History of abnormal Pap smear: NO - age 30-65 PAP every 5 years with negative HPV co-testing recommended  PAP / HPV Latest  "Ref Rng & Units 11/3/2017   PAP - NIL   HPV 16 DNA NEG:Negative Negative   HPV 18 DNA NEG:Negative Negative   OTHER HR HPV NEG:Negative Negative     Reviewed and updated as needed this visit by clinical staff  Tobacco  Allergies  Meds  Med Hx  Surg Hx  Fam Hx  Soc Hx        Reviewed and updated as needed this visit by Provider            Review of Systems   Constitutional: Negative for chills.   HENT: Negative for congestion and ear pain.    Eyes: Negative for pain.   Respiratory: Negative for cough.    Cardiovascular: Negative for chest pain.   Gastrointestinal: Negative for abdominal pain, constipation, diarrhea and hematochezia.   Genitourinary: Negative for hematuria.   Neurological: Negative for dizziness.          OBJECTIVE:   /62 (BP Location: Right arm, Cuff Size: Adult Regular)  Pulse 74  Temp 98  F (36.7  C) (Oral)  Ht 5' 2.75\" (1.594 m)  Wt 124 lb (56.2 kg)  LMP 10/25/2018  SpO2 99%  BMI 22.14 kg/m2  Physical Exam  GENERAL: healthy, alert and no distress  EYES: Eyes grossly normal to inspection, PERRL and conjunctivae and sclerae normal  HENT: ear canals and TM's normal, nose and mouth without ulcers or lesions  NECK: no adenopathy, no asymmetry, masses, or scars and thyroid normal to palpation  RESP: lungs clear to auscultation - no rales, rhonchi or wheezes  BREAST: normal without masses, tenderness or nipple discharge and no palpable axillary masses or adenopathy  CV: regular rate and rhythm, normal S1 S2, no S3 or S4, no murmur, click or rub, no peripheral edema and peripheral pulses strong  ABDOMEN: soft, nontender, no hepatosplenomegaly, no masses and bowel sounds normal  MS: no gross musculoskeletal defects noted, no edema  SKIN: no suspicious lesions or rashes  NEURO: Normal strength and tone, mentation intact and speech normal  PSYCH: mentation appears normal, affect normal/bright    Diagnostic Test Results:  none     ASSESSMENT/PLAN:   1. Routine general medical examination at " "a health care facility    2. Other specified hypothyroidism  Recheck - will need renewed prescription afterwards  - TSH with free T4 reflex    3. Need for prophylactic vaccination and inoculation against influenza  - FLU VACCINE, SPLIT VIRUS, IM (QUADRIVALENT) [45454]- >3 YRS    4. Screening for cardiovascular condition  - Lipid Profile (Chol, Trig, HDL, LDL calc)    5. Vitamin D deficiency  - Vitamin D Deficiency    6. Screening for diabetes mellitus  - Glucose    COUNSELING:  Reviewed preventive health counseling, as reflected in patient instructions    BP Readings from Last 1 Encounters:   10/31/18 102/62     Estimated body mass index is 22.14 kg/(m^2) as calculated from the following:    Height as of this encounter: 5' 2.75\" (1.594 m).    Weight as of this encounter: 124 lb (56.2 kg).           reports that she has never smoked. She has never used smokeless tobacco.      Counseling Resources:  ATP IV Guidelines  Pooled Cohorts Equation Calculator  Breast Cancer Risk Calculator  FRAX Risk Assessment  ICSI Preventive Guidelines  Dietary Guidelines for Americans, 2010  USDA's MyPlate  ASA Prophylaxis  Lung CA Screening    Jenny Bland MD  [facility]    Injectable Influenza Immunization Documentation    1.  Is the person to be vaccinated sick today?   No    2. Does the person to be vaccinated have an allergy to a component   of the vaccine?   No  Egg Allergy Algorithm Link    3. Has the person to be vaccinated ever had a serious reaction   to influenza vaccine in the past?   No    4. Has the person to be vaccinated ever had Guillain-Barré syndrome?   No    Form completed by Neela Rousseau MA           "

## 2018-11-01 RX ORDER — LEVOTHYROXINE SODIUM 50 UG/1
50 TABLET ORAL DAILY
Qty: 90 TABLET | Refills: 1 | Status: SHIPPED | OUTPATIENT
Start: 2018-11-01 | End: 2019-02-07

## 2019-02-05 DIAGNOSIS — E03.8 OTHER SPECIFIED HYPOTHYROIDISM: ICD-10-CM

## 2019-02-05 LAB
T4 FREE SERPL-MCNC: 1.05 NG/DL (ref 0.76–1.46)
TSH SERPL DL<=0.005 MIU/L-ACNC: 7.69 MU/L (ref 0.4–4)

## 2019-02-05 PROCEDURE — 36415 COLL VENOUS BLD VENIPUNCTURE: CPT | Performed by: PEDIATRICS

## 2019-02-05 PROCEDURE — 84439 ASSAY OF FREE THYROXINE: CPT | Performed by: PEDIATRICS

## 2019-02-05 PROCEDURE — 84443 ASSAY THYROID STIM HORMONE: CPT | Performed by: PEDIATRICS

## 2019-02-06 DIAGNOSIS — E03.8 OTHER SPECIFIED HYPOTHYROIDISM: Primary | ICD-10-CM

## 2019-02-07 ENCOUNTER — MYC REFILL (OUTPATIENT)
Dept: PEDIATRICS | Facility: CLINIC | Age: 34
End: 2019-02-07

## 2019-02-07 DIAGNOSIS — E03.8 OTHER SPECIFIED HYPOTHYROIDISM: ICD-10-CM

## 2019-02-07 RX ORDER — LEVOTHYROXINE SODIUM 50 UG/1
50 TABLET ORAL DAILY
Qty: 90 TABLET | Refills: 1 | Status: SHIPPED | OUTPATIENT
Start: 2019-02-07 | End: 2019-04-14

## 2019-02-07 NOTE — TELEPHONE ENCOUNTER
Prescription approved per [facility] Refill Protocol.    Valeri Ordonez RN

## 2019-04-14 ENCOUNTER — MYC REFILL (OUTPATIENT)
Dept: PEDIATRICS | Facility: CLINIC | Age: 34
End: 2019-04-14

## 2019-04-14 DIAGNOSIS — E03.8 OTHER SPECIFIED HYPOTHYROIDISM: ICD-10-CM

## 2019-04-17 RX ORDER — LEVOTHYROXINE SODIUM 50 UG/1
50 TABLET ORAL DAILY
Qty: 90 TABLET | Refills: 0 | Status: SHIPPED | OUTPATIENT
Start: 2019-04-17 | End: 2019-09-22

## 2019-04-17 NOTE — TELEPHONE ENCOUNTER
"Requested Prescriptions   Pending Prescriptions Disp Refills     levothyroxine (SYNTHROID/LEVOTHROID) 50 MCG tablet 90 tablet 1     Sig: Take 1 tablet (50 mcg) by mouth daily       Thyroid Protocol Failed - 4/14/2019  7:32 PM        Failed - Normal TSH on file in past 12 months     Recent Labs   Lab Test 02/05/19  0932   TSH 7.69*              Passed - Patient is 12 years or older        Passed - Recent (12 mo) or future (30 days) visit within the authorizing provider's specialty     Patient had office visit in the last 12 months or has a visit in the next 30 days with authorizing provider or within the authorizing provider's specialty.  See \"Patient Info\" tab in inbasket, or \"Choose Columns\" in Meds & Orders section of the refill encounter.              Passed - Medication is active on med list        Passed - No active pregnancy on record     If patient is pregnant or has had a positive pregnancy test, please check TSH.          Passed - No positive pregnancy test in past 12 months     If patient is pregnant or has had a positive pregnancy test, please check TSH.          Last office visit 10/31/18  Last filled 2/7/19    Prescription approved per [facility] Refill Protocol.  Cynthia Jonas RN    "

## 2020-03-11 ENCOUNTER — HEALTH MAINTENANCE LETTER (OUTPATIENT)
Age: 35
End: 2020-03-11

## 2020-12-27 ENCOUNTER — HEALTH MAINTENANCE LETTER (OUTPATIENT)
Age: 35
End: 2020-12-27

## 2021-04-25 ENCOUNTER — HEALTH MAINTENANCE LETTER (OUTPATIENT)
Age: 36
End: 2021-04-25

## 2021-10-09 ENCOUNTER — HEALTH MAINTENANCE LETTER (OUTPATIENT)
Age: 36
End: 2021-10-09

## 2022-05-21 ENCOUNTER — HEALTH MAINTENANCE LETTER (OUTPATIENT)
Age: 37
End: 2022-05-21

## 2022-09-17 ENCOUNTER — HEALTH MAINTENANCE LETTER (OUTPATIENT)
Age: 37
End: 2022-09-17

## 2023-06-04 ENCOUNTER — HEALTH MAINTENANCE LETTER (OUTPATIENT)
Age: 38
End: 2023-06-04